# Patient Record
Sex: FEMALE | Race: WHITE
[De-identification: names, ages, dates, MRNs, and addresses within clinical notes are randomized per-mention and may not be internally consistent; named-entity substitution may affect disease eponyms.]

---

## 2017-02-09 NOTE — HP
CC:  Bi Chairez MD; Raisa Kenney NP at [facility].

 

ADMISSION HISTORY AND PHYSICAL:

 

DATE OF ADMISSION:  17

 

ATTENDING SURGEON:  Armando Garsia MD

 

CHIEF COMPLAINT:  Chronic cholecystitis.

 

HISTORY OF PRESENT ILLNESS:  This is a 31-year-old female who since 2016 has had symptoms of up
per abdominal pain.  This typically occurs after eating associated with nausea and vomiting.  It is 
located in the right upper quadrant and epigastric region with radiation to the back.  It is particu
larly worse after fatty food ingestion.  In fact, she has changed her eating habits significantly an
d as a result has lost approximately 25 pounds.  She denies history of dark urine or light colored s
tool.  There is a family history of gallbladder disease in her grandmother.  She did have lab work c
vee in August, which revealed normal liver function test.  A prior ultrasound in  was apparen
tly normal.  More recently, on 16, she underwent HIDA scan, which showed minimal gallbladder f
illing at the end of 2 hours.  There was patency of the common bile duct.  EGD was performed showing
 only mild duodenitis.  A gastric emptying study done on 17 was normal.  The patient was then 
referred for surgical evaluation and was seen in the office by Dr. Garsia on 17.  Dr. Garsia rev
iewed her history and workup and felt that symptoms and HIDA scan were consistent with chronic radha
cystitis and that repeat ultrasound was not indicated.  He has reviewed with her the indications for
 surgery.  The risks, benefits, and alternatives.  She understands the expected perioperative course
 and would like to proceed as scheduled with laparoscopic cholecystectomy.

 

PAST MEDICAL HISTORY:  Scoliosis (status post Clement jose miguel surgery in ), she does have some de
gree of chronic neck and back pain.  She has a history of anxiety, migraine headaches.

 

PAST SURGICAL HISTORY:  Clement jose miguel placement (posterior approach with bone grafting,  i
n ).  She does report history of postoperative nausea and vomiting.

 

CURRENT MEDICATIONS:

1.  Ibuprofen 200 mg three tablets p.r.n. for neck and back pain (does not use daily).

2.  Hydrocodone/APAP 5/325 one tablet q.4 to 6 hours p.r.n. neck and back pain (uses infrequently).

3.  Lorazepam 0.25 mg b.i.d. p.r.n. anxiety (uses infrequently).

4.  Multivitamin once daily.

 

DRUG ALLERGIES:  None known.

 

FAMILY HISTORY:  As above.  No known family history of anesthesia problems, bleeding, or clotting di
sorders.

 

SOCIAL HISTORY:  The patient is .  She has 2 children.  She is employed as a .  Michael silvestre smokes one half pack per day and we discussed smoking cessation. She denies history of alcohol use
 or other recreational drug use.

 

REVIEW OF SYSTEMS:  General:  No recent constitutional symptoms other than as noted above.  Also, michael silvestre is recovering from an episode of flu (tested negative, but has had some lingering congestion; no r
ecent fever or productive cough). Cardiovascular:  No chest pain, palpitations, history of hypertens
ion or heart murmur.  Respiratory:  No history of asthma, chronic cough, or shortness of breath. GI:
  As above per HPI.  She also has occasions of loose stool following attacks of upper abdominal pain
.  :  No problems reported.  GYN:  She underwent routine breast and pelvic exams as well as  Pap s
mear in 2016 all reportedly normal.  Endocrine:  No diabetes or thyroid dysfunction.

 

                               PHYSICAL EXAMINATION

 

GENERAL:  Well-nourished, well-developed mildly obese female, in no acute distress.

 

VITAL SIGNS:  Height 62 inches, weight 170 pounds by history, other vital signs per nursing.

 

HEENT:  Pupils equal, round, and reactive.  EOMs intact.  No conjunctival pallor or scleral icterus.
  Oropharynx:  Mucous membranes moist.  No intraoral lesions.

 

SKIN:  Warm and dry.  No suspicious rashes or lesions noted.

 

NECK:  No lymphadenopathy, thyromegaly, or masses.

 

LUNGS:  Clear to auscultation.  No wheezes.

 

HEART:  Regular rate and rhythm.  No murmur appreciated.

 

BREASTS:  Not examined.

 

ABDOMEN:  Well healed Pfannenstiel incision.  Flat, nondistended.  Soft and without significant tend
erness to palpation on today's exam.  No palpable masses or organomegaly.

 

GENITALIA:  Not done.

 

RECTAL:  Not done.

 

BACK:  Well healed surgical scar.

 

EXTREMITIES:  No edema.

 

NEUROLOGICAL:  Grossly intact.

 

 IMPRESSION:  Chronic cholecystitis.

 

PLAN:  Laparoscopic cholecystectomy.

 

 ____________________________________ STEVEN MOREIRA, PA

 

[id number]/[facility] #: [id number]

## 2017-02-22 ENCOUNTER — HOSPITAL ENCOUNTER (OUTPATIENT)
Dept: HOSPITAL 25 - OR | Age: 32
Discharge: HOME | End: 2017-02-22
Attending: SURGERY
Payer: COMMERCIAL

## 2017-02-22 VITALS — SYSTOLIC BLOOD PRESSURE: 99 MMHG | DIASTOLIC BLOOD PRESSURE: 66 MMHG

## 2017-02-22 DIAGNOSIS — F17.210: ICD-10-CM

## 2017-02-22 DIAGNOSIS — K81.1: Primary | ICD-10-CM

## 2017-02-22 LAB
MANUAL ENTRY VERIFICATION: (no result)
UR PREG INTERNAL CONTROL: (no result)
UR PREG KIT LOT#: (no result)

## 2017-02-22 PROCEDURE — 81025 URINE PREGNANCY TEST: CPT

## 2017-02-22 PROCEDURE — 88304 TISSUE EXAM BY PATHOLOGIST: CPT

## 2017-02-22 NOTE — PN
Progress Note





- Progress Note


Note: 





Brief Operative Note:





Preop Dx: chronic cholecystitis


Postop Dx: same


Procedure: laparoscopic cholecystectomy


Anesthesia: GET


Surgeon: Ady


Asst: KRISSY Pereira; Yamil Cabrera


EBL: none


Fluids: 1500 ml RL


Drains: none


Findings: dictated

## 2017-03-09 NOTE — OP
DATE OF OPERATION:  02/22/17 [facility]

 

DATE OF BIRTH:  [birth year]

 

SURGEON:  Armando Garsia MD.

 

ASSISTANTS:  KRISSY Hanson, and PA Student, Gray.



ANESTHESIOLOGIST:  Speedy Pollack MD

 

ANESTHESIA:  General.

 

PRE-OP DIAGNOSIS:  Chronic cholecystitis.

 

POST-OP DIAGNOSIS:  Chronic cholecystitis.

 

OPERATIVE PROCEDURE:  Laparoscopic cholecystectomy.

 

ESTIMATED BLOOD LOSS:  Minimal.

 

FLUIDS:  1500 cc of Lactated Ringer's.

 

DRAINS:  None.

 

DESCRIPTION OF PROCEDURE:  The patient was identified in the preoperative area, 
marked, brought to the operating room, placed on the operating table in supine 
position.  Preoperative antibiotics were given.  Sequential devices were placed 
in bilateral lower extremities.  General anesthesia was induced.  Patient's 
abdomen was prepped and draped in a standard surgical fashion and a time-out 
was performed.

 

The folds of the umbilicus were elevated anteriorly and a Veress needle was 
inserted into the abdominal cavity, which was then allowed to be insufflated to 
a pressure of 15 mmHg.  The patient tolerated the insufflation well.  An 
incision was made over the Veress needle, which was then removed and a 5-mm 
trocar was inserted into the abdominal cavity.  A laparoscope was inserted 
through this and there was no evidence of injury from the trocar insertion.  
Table was then positioned to view the gallbladder.  Additional trocars were 
placed in the following position; a 12 mm in the subxiphoid area, two 5 mm 
along the right costal margin.  The fundus of the gallbladder was identified.  
It was elevated anteriorly.  A blunt dissection was carried out to free some 
adhesions at this site.  This was then elevated over the liver.  Infundibulum 
was then grasped and retracted towards the right lower quadrant to help expose 
the critical view.  Peritoneal attachments were taken. Cystic artery and cystic 
ducts were isolated.  They were doubly clipped and ligated and the gallbladder 
was removed from the liver bed and placed in an endoscopic retrieval bag and 
brought out through the subxiphoid port.  Cystic duct stump and cystic artery 
stump were reviewed as typical and were within normal limits.  The abdomen was 
allowed to collapse.  Trocars were removed under direct vision and all 4 skin 
incisions were reviewed and approximated with 4-0 Monocryl subcuticular sutures 
followed by sterile dressing.  Patient tolerated the procedure well and was 
transferred to the PACU in stable condition.



CC:  [facility]; Raisa Kenney NP* 

 

 [id number]/[facility] #: [id number]

SHERINE

## 2017-09-28 ENCOUNTER — HOSPITAL ENCOUNTER (EMERGENCY)
Dept: HOSPITAL 25 - UCEAST | Age: 32
Discharge: HOME | End: 2017-09-28
Payer: COMMERCIAL

## 2017-09-28 VITALS — SYSTOLIC BLOOD PRESSURE: 117 MMHG | DIASTOLIC BLOOD PRESSURE: 72 MMHG

## 2017-09-28 DIAGNOSIS — J06.9: Primary | ICD-10-CM

## 2017-09-28 DIAGNOSIS — F17.210: ICD-10-CM

## 2017-09-28 DIAGNOSIS — F41.9: ICD-10-CM

## 2017-09-28 DIAGNOSIS — R19.7: ICD-10-CM

## 2017-09-28 DIAGNOSIS — R11.2: ICD-10-CM

## 2017-09-28 PROCEDURE — 99212 OFFICE O/P EST SF 10 MIN: CPT

## 2017-09-28 PROCEDURE — 81003 URINALYSIS AUTO W/O SCOPE: CPT

## 2017-09-28 PROCEDURE — G0463 HOSPITAL OUTPT CLINIC VISIT: HCPCS

## 2017-09-28 NOTE — UC
Abdominal Pain Female HPI





- HPI Summary


HPI Summary: 





cough ear ache headache st,nausea, vomiting diarrhea waxing and waning for 6 

days--no fevers, voiding qs





- History of Current Complaint


Chief Complaint: UCRespiratory


Stated Complaint: URI


Time Seen by Provider: 09/28/17 19:30


Hx Obtained From: Patient


Hx Last Menstrual Period: IMPLANTED BIRTH CONTROL


Pregnant?: No


Onset/Duration: Gradual Onset, Lasting Days, Other - waxing and waning


Timing: Intermittent Episodes Lasting:


Severity Initially: Mild


Severity Currently: Mild


Location: Diffuse


Radiates: No


Radiates to: Other


Character: Aching, Cramping


Aggravating Factor(s): Food


Alleviating Factor(s): Nothing


Associated Signs and Symptoms: Positive: Decreased Appetite, Nausea, Vomiting, 

Diarrhea


Allergies/Adverse Reactions: 


 Allergies











Allergy/AdvReac Type Severity Reaction Status Date / Time


 


Minocycline Allergy Intermediate Hives Verified 09/28/17 19:27











Home Medications: 


 Home Medications





Accutaine* 80 mg PO DAILY 09/28/17 [History Confirmed 09/28/17]











PMH/Surg Hx/FS Hx/Imm Hx


Previously Healthy: No


Psychological History: Anxiety





- Surgical History


Surgical History: Yes


Surgery Procedure, Year, and Place: SPINE surgery age 14 DANDRE RODS DOWN 

SPINE;.  c section 2012;.  wisdom teeth removed;.  GALLBLADDER;





- Family History


Known Family History: Positive: None





- Social History


Occupation: Employed Part-time


Lives: With Family


Alcohol Use: None


Substance Use Type: None


Smoking Status (MU): Current Every Day Smoker


Type: Cigarettes


Amount Used/How Often: 1/2 ppd


Have You Smoked in the Last Year: Yes


Household Exposure Type: Cigarettes





- Immunization History


Most Recent Influenza Vaccination: NOT THIS SEASON


Most Recent Tetanus Shot: UNSURE





Review of Systems


Constitutional: Negative


Skin: Negative


Eyes: Negative


ENT: Sore Throat, Ear Ache, Nasal Discharge


Respiratory: Negative


Cardiovascular: Negative


Gastrointestinal: Abdominal Pain, Vomiting, Diarrhea, Nausea


Genitourinary: Negative


Motor: Negative


Neurovascular: Negative


Musculoskeletal: Negative


Neurological: Negative


Psychological: Negative


Is Patient Immunocompromised?: No


All Other Systems Reviewed And Are Negative: Yes





Physical Exam


Triage Information Reviewed: Yes


Appearance: Ill-Appearing - mild, Pain Distress - mild


Vital Signs: 


 Initial Vital Signs











Temp  97.6 F   09/28/17 19:23


 


Pulse  80   09/28/17 19:23


 


Resp  16   09/28/17 19:23


 


BP  117/72   09/28/17 19:23


 


Pulse Ox  100   09/28/17 19:23











Vital Signs Reviewed: Yes


Eye Exam: Normal


Eyes: Positive: Conjunctiva Clear


ENT Exam: Normal


ENT: Positive: Normal ENT inspection, Hearing grossly normal, Pharynx normal, 

Nasal congestion, TMs normal.  Negative: Nasal drainage, Tonsillar swelling, 

Tonsillar exudate, Trismus, Muffled/hoarse voice


Dental Exam: Normal


Neck exam: Normal


Neck: Positive: Supple, Nontender, Enlarged Nodes @


Respiratory Exam: Normal


Respiratory: Positive: Chest non-tender, Lungs clear, Normal breath sounds, No 

respiratory distress, No accessory muscle use


Cardiovascular Exam: Normal


Cardiovascular: Positive: RRR, No Murmur, Pulses Normal, Brisk Capillary Refill


Abdominal Exam: Normal


Abdomen Description: Positive: No Organomegaly, Soft, Other: - diffuse 

discomfort.  Negative: CVA Tenderness (R), CVA Tenderness (L), Distended, 

Guarding, Hepatomegaly, McBurney's Point Tenderness, Peritoneal Signs, 

Pulsatile Mass, Splenomegaly


Bowel Sounds: Positive: Present


Musculoskeletal Exam: Normal


Musculoskeletal: Positive: Strength Intact, ROM Intact, No Edema


Neurological Exam: Normal


Neurological: Positive: Alert, Muscle Tone Normal


Psychological Exam: Normal


Skin Exam: Normal





Diagnostics





- Laboratory


Diagnostic Studies Completed/Ordered: ua-trace ketones





Re-Evaluation





- Re-Evaluation


  ** First Eval


Change: Improved - taking po fluids well no pain nausea or vomiting





Abd Pain Female Course/Dx





- Course


Course Of Treatment: clear liquids advance diet slowly, zofran follow with pcp 

prn





- Differential Dx/Diagnosis


Differential Diagnosis: Appendicitis, Constipation, Diverticulitis, Irritable 

Bowel Syndrome, Pelvic Inflammatory Disease, Renal Colic, Urinary Tract 

Infection, Other - acute n/v/d/ viral uri


Provider Diagnoses: Viral uri, acute n/v/d





Discharge





- Discharge Plan


Condition: Stable


Disposition: HOME


Patient Education Materials:  Urinary Tract Infection in Women (ED), Clear 

Liquid Diet (ED), Acute Nausea and Vomiting (ED), Acute Diarrhea (ED), 

Nutrition Tips for Relief of Diarrhea (ED)


Forms:  *Work Release


Referrals: 


Raisa Kenney NP [Primary Care Provider] - 3 Days

## 2017-12-24 ENCOUNTER — HOSPITAL ENCOUNTER (EMERGENCY)
Dept: HOSPITAL 25 - UCEAST | Age: 32
Discharge: HOME | End: 2017-12-24
Payer: COMMERCIAL

## 2017-12-24 VITALS — DIASTOLIC BLOOD PRESSURE: 73 MMHG | SYSTOLIC BLOOD PRESSURE: 112 MMHG

## 2017-12-24 DIAGNOSIS — J20.9: Primary | ICD-10-CM

## 2017-12-24 PROCEDURE — G0463 HOSPITAL OUTPT CLINIC VISIT: HCPCS

## 2017-12-24 PROCEDURE — 93005 ELECTROCARDIOGRAM TRACING: CPT

## 2017-12-24 PROCEDURE — 71020: CPT

## 2017-12-24 PROCEDURE — 99213 OFFICE O/P EST LOW 20 MIN: CPT

## 2017-12-24 NOTE — UC
Cardiac HPI





- HPI Summary


HPI Summary: 


 Pt presents to  stating x 4 days her chest feels tight and feels like can't 

get a full breath. Pt denies feeling SOB. No chest burning. Pt with cough with 

yellow secretions. PT with PND, congestion. No fevers, chills + fatigue. No OTC 

meds  No rash + sick contacts.  Pt quit smoking 1 week ago





Pt's medications reviewed this visit








- History of Current Complaint


Chief Complaint: UCChestPain


Stated Complaint: PRESSURE IN CHEST, DIFF BREATHING


Time Seen by Provider: 17 16:23


Hx Obtained From: Patient


Hx Last Menstrual Period: 17 (implant)


Onset/Duration: Gradual Onset


Timing: Constant


Initial Severity: Mild


Current Severity: Mild





- Allergy/Home Medications


Allergies/Adverse Reactions: 


 Allergies











Allergy/AdvReac Type Severity Reaction Status Date / Time


 


Minocycline Allergy Intermediate Hives Verified 17 16:44











Home Medications: 


 Home Medications





Ibuprofen [Advil] 200 mg PO Q6H PRN 17 [History Confirmed 17]











PMH/Surg Hx/FS Hx/Imm Hx


Previously Healthy: Yes - scoliosis





- Surgical History


Surgical History: Yes


Surgery Procedure, Year, and Place: SPINE surgery age 14 DANDRE RODS DOWN 

SPINE;.  c section ;.  wisdom teeth removed;.  GALLBLADDER;





- Family History


Known Family History: Positive: None





- Social History


Occupation: Employed Full-time


Lives: With Family


Alcohol Use: None


Substance Use Type: None


Smoking Status (MU): Former Smoker


Type: Cigarettes


Amount Used/How Often: 1/2 ppd


Length of Time of Smoking/Using Tobacco: quit 1 wk ago


Have You Smoked in the Last Year: Yes


Household Exposure Type: Cigarettes





- Immunization History


Most Recent Influenza Vaccination: NOT THIS SEASON


Most Recent Tetanus Shot: UNSURE





Review of Systems


Constitutional: Fatigue


Skin: Negative


Respiratory: Cough


Cardiovascular: Chest Pain - "tight with breathing"


All Other Systems Reviewed And Are Negative: Yes





Physical Exam


Triage Information Reviewed: Yes


Appearance: Well-Appearing, No Pain Distress, Well-Nourished


Vital Signs: 


 Initial Vital Signs











Temp  97.8 F   17 16:32


 


Pulse  68   17 16:32


 


Resp  16   17 16:32


 


BP  112/73   17 16:32


 


Pulse Ox  100   17 16:32











Vital Signs Reviewed: Yes


Eye Exam: Normal


Eyes: Positive: Conjunctiva Clear


ENT Exam: Normal


ENT: Positive: Normal ENT inspection, Hearing grossly normal, Pharynx normal


Dental Exam: Normal


Neck exam: Normal


Neck: Positive: Supple, Nontender, No Lymphadenopathy


Respiratory Exam: Normal


Respiratory: Positive: Chest non-tender, Other: - tight bs throughout  few 

scattered wheeze


Cardiovascular Exam: Normal


Cardiovascular: Positive: RRR, No Murmur, Pulses Normal


Abdominal Exam: Normal


Abdomen Description: Positive: Nontender, No Organomegaly, Soft


Bowel Sounds: Positive: Present


Musculoskeletal Exam: Normal


Neurological Exam: Normal


Neurological: Positive: Alert


Psychological Exam: Normal


Skin Exam: Normal





Diagnostics





- Radiology


  ** No standard instances


Radiology Interpretation Completed By: Radiologist - Ordering Physician: Tram Carter MD Acct.#: [id number] : 1985 Age: 32 Sex: F Location: [facility] Exam Date: 17 1648 ADM Status: [facility] ER  Order 

Information: CHEST PA LAT 2 VWS Accession Number: [id number] CPT: 05602 

Indication: Cough.  2 views of the chest including dual energy PA views 

demonstrates no pleural fluid, pneumonia or pneumothorax. Heart is of normal 

size and configuration. Marked dextroscoliosis of the thoracic spine is 

present.  IMPRESSION: No active cardiopulmonary disease is noted.   ____________

________________________________________________ <Electronically signed by Jacinda Peres MD in OV> 17  Dictated By: Jacinda Peres MD Dictated Date/Time

: 17 Transcribed Date/Time: 17 6984  Copy to:





- EKG


Cardiac Rate: NL


Cardiac Rhythm: Sinus: Normal - inverted T wave 3 - no change 


Ectopy: None


ST Segment: Normal





Re-Evaluation





- Re-Evaluation


  ** First Eval


Change: Improved - Pt states feels markedly improved + BS throughout  no w/r 

states feels "better"  Will give Rx zithromax (dispense for tomorrow)  MDI 

secretion precautions Pt comfortable and in agreement with plan





- Assessment/Plan


Course Of Treatment: Pt with chest congestion, tightness and cough x 10 days.  

Pt with tight bs and scattered wheeze.  No concern for cardiac origin - EKG 

reviewed and unchanged from 2012.  Will give duoneb and reassess.  check CXR





- Clinical Impression


Provider Diagnoses: acute bronchitis





Discharge





- Discharge Plan


Condition: Stable


Disposition: HOME


Prescriptions: 


Azithromycin TAB* [Zithromax TAB (Z-OLY) 250 mg #6 tabs] 250 mg PO DAILY #3 tab


Patient Education Materials:  Acute Bronchitis (ED)


Referrals: 


Raisa Kenney NP [Primary Care Provider] - 


Additional Instructions: 


- Stay well hydrated. Drink plenty of non-alcoholic, non-caffinated beverages.


- Take antibiotics as prescribed until gone - you have 3 more tabs to  

at the pharmacy on Tuesday


- After you have been on antibiotics for 2 days - change your toothbrush and 

your pillowcase. These infections are spread by secretions - do NOT share 

eating or drinking utensils - clean items you share with other people such as 

cell phones, computer mouse, TV remote, computer tablets,  etc


-  Use inhaler - 2 puffs every 4 hours today, then every 4 hours as needed


- Okay to use over the counter medication for cough


 - Okay to alternate ibuprofen (Advil, Motrin) and tylenol every 3 hours for 

pain. take with food 


- Contact your doctor to schedule a follow-up appointment.  Contact your doctor 

or return with questions or concerns

## 2017-12-24 NOTE — RAD
Indication: Cough.



2 views of the chest including dual energy PA views demonstrates no pleural fluid,

pneumonia or pneumothorax. Heart is of normal size and configuration. Marked

dextroscoliosis of the thoracic spine is present.



IMPRESSION: No active cardiopulmonary disease is noted.

## 2018-04-17 ENCOUNTER — HOSPITAL ENCOUNTER (EMERGENCY)
Dept: HOSPITAL 25 - ED | Age: 33
Discharge: HOME | End: 2018-04-17
Payer: COMMERCIAL

## 2018-04-17 VITALS — DIASTOLIC BLOOD PRESSURE: 66 MMHG | SYSTOLIC BLOOD PRESSURE: 108 MMHG

## 2018-04-17 DIAGNOSIS — R06.02: ICD-10-CM

## 2018-04-17 DIAGNOSIS — Z87.891: ICD-10-CM

## 2018-04-17 DIAGNOSIS — R07.89: ICD-10-CM

## 2018-04-17 DIAGNOSIS — R05: ICD-10-CM

## 2018-04-17 DIAGNOSIS — L76.34: Primary | ICD-10-CM

## 2018-04-17 LAB
BASOPHILS # BLD AUTO: 0.1 10^3/UL (ref 0–0.2)
EOSINOPHIL # BLD AUTO: 1.2 10^3/UL (ref 0–0.6)
HCT VFR BLD AUTO: 37 % (ref 35–47)
HGB BLD-MCNC: 12.8 G/DL (ref 12–16)
LYMPHOCYTES # BLD AUTO: 2.7 10^3/UL (ref 1–4.8)
MCH RBC QN AUTO: 31 PG (ref 27–31)
MCHC RBC AUTO-ENTMCNC: 34 G/DL (ref 31–36)
MCV RBC AUTO: 90 FL (ref 80–97)
MONOCYTES # BLD AUTO: 0.5 10^3/UL (ref 0–0.8)
NEUTROPHILS # BLD AUTO: 7.7 10^3/UL (ref 1.5–7.7)
NRBC # BLD AUTO: 0 10^3/UL
NRBC BLD QL AUTO: 0.1
PLATELET # BLD AUTO: 272 10^3/UL (ref 150–450)
RBC # BLD AUTO: 4.15 10^6/UL (ref 4–5.4)
WBC # BLD AUTO: 12.2 10^3/UL (ref 3.5–10.8)

## 2018-04-17 PROCEDURE — 71275 CT ANGIOGRAPHY CHEST: CPT

## 2018-04-17 PROCEDURE — 96374 THER/PROPH/DIAG INJ IV PUSH: CPT

## 2018-04-17 PROCEDURE — 84484 ASSAY OF TROPONIN QUANT: CPT

## 2018-04-17 PROCEDURE — 80053 COMPREHEN METABOLIC PANEL: CPT

## 2018-04-17 PROCEDURE — 36415 COLL VENOUS BLD VENIPUNCTURE: CPT

## 2018-04-17 PROCEDURE — 99282 EMERGENCY DEPT VISIT SF MDM: CPT

## 2018-04-17 PROCEDURE — 83605 ASSAY OF LACTIC ACID: CPT

## 2018-04-17 PROCEDURE — 84702 CHORIONIC GONADOTROPIN TEST: CPT

## 2018-04-17 PROCEDURE — 85025 COMPLETE CBC W/AUTO DIFF WBC: CPT

## 2018-04-17 PROCEDURE — 93005 ELECTROCARDIOGRAM TRACING: CPT

## 2018-04-17 NOTE — ED
Shay DIAZ Julia, scribed for Ramses Sosa MD on 04/17/18 at 1133 .





HPI Chest Pain





- HPI Summary


HPI Summary: 





This patient is a 32 year old F presenting to [facility] with a chief complaint of 

intermittent  mid sternal chest heaviness and SOB since yesterday s/p back 

surgery on 4/3/18. Patient reports a mild cough. Patient denies fever, 

abdominal pain, and urinary symptoms. The patient rates the pain 4/10 in 

severity. Symptoms aggravated by lying down. She states the bruising from her 

chest after her back surgery, for spinal stenosis and scoliosis,  has just 

recently healed.  She has been taking oxycodone and Tylenol for pain, and 

regularly takes hypothyroid medication. She is not taking blood thinners. Pt 

has Nexplanon implant. 





- History of Current Complaint


Chief Complaint: EDChestWallPain


Time Seen by Provider: 04/17/18 11:17


Hx Obtained From: Patient


Hx Last Menstrual Period: 12/23/17 (implant)


Onset/Duration: Started Days Ago, Still Present


Timing: Intermittent


Pain Intensity: 4


Pain Scale Used: 0-10 Numeric


Chest Pain Location: Mid Sternal


Chest Pain Radiates: No


Character: Heaviness


Associated Signs and Symptoms: Positive: Shortness of Breath, Cough.  Negative: 

Fever, Abdominal Pain





- Allergy/Home Medications


Allergies/Adverse Reactions: 


 Allergies











Allergy/AdvReac Type Severity Reaction Status Date / Time


 


MS Minocycline [Minocycline] Allergy Intermediate Hives Verified 12/24/17 16:44














PMH/Surg Hx/FS Hx/Imm Hx


Endocrine/Hematology History: Reports: Hx Thyroid Disease - Hypothyroidism


   Denies: Hx Diabetes


Cardiovascular History: 


   Denies: Hx Congestive Heart Failure, Hx Hypertension, Hx Pacemaker/ICD


Respiratory History: 


   Denies: Hx Asthma, Hx Chronic Obstructive Pulmonary Disease (COPD)


GI History: Reports: Other GI Disorders - gall bladder


   Denies: Hx Ulcer


 History: 


   Denies: Hx Renal Disease


Musculoskeletal History: Reports: Hx Arthritis - osteoarthritis in lumbar back, 

Hx Scoliosis - Clement rods


Sensory History: 


   Denies: Hx Contacts or Glasses, Hx Hearing Aid


Opthamlomology History: 


   Denies: Hx Contacts or Glasses


Neurological History: Reports: Hx Headaches - FOR 2 WEEKS, Hx Migraine


Psychiatric History: Reports: Hx Anxiety


   Denies: Hx Panic Disorder





- Surgical History


Surgery Procedure, Year, and Place: SPINE surgery age 14 DANDRE RODS DOWN 

SPINE;.  c section 2012;.  wisdom teeth removed;.  GALLBLADDER;


Hx Anesthesia Reactions: Yes - after wisdom teeth removed, nausea and vomiting 

after anesthesia


Infectious Disease History: No


Infectious Disease History: 


   Denies: Hx Clostridium Difficile, Hx Hepatitis, Hx Human Immunodeficiency 

Virus (HIV), Hx of Known/Suspected MRSA, Hx Shingles, Hx Tuberculosis, Hx Known/

Suspected VRE, Hx Known/Suspected VRSA, History Other Infectious Disease, 

Traveled Outside the [place] in Last 30 Days





- Family History


Known Family History: Positive: None





- Social History


Alcohol Use: None


Substance Use Type: Reports: None


Hx Tobacco Use: Yes - quit 4 months ago


Smoking Status (MU): Former Smoker


Type: Cigarettes


Amount Used/How Often: 1/2 ppd


Length of Time of Smoking/Using Tobacco: quit 1 wk ago


Have You Smoked in the Last Year: Yes





Review of Systems


Negative: Fever


Positive: Chest Pain


Positive: Shortness Of Breath, Cough


Negative: Abdominal Pain


Positive: no symptoms reported


All Other Systems Reviewed And Are Negative: Yes





Physical Exam





- Summary


Physical Exam Summary: 





Appearance: Well appearing, no pain distress


Skin: warm, dry, reflects adequate perfusion, moist mucous membranes


Head/face: normal


Eyes: EOMI, AARON


ENT: normal


Neck: supple, non-tender


Respiratory: CTA, breath sounds present


Cardiovascular: RRR, pulses symmetrical, Sub sternal reproducible chest pain 

upon applying pressure


Abdomen: non-tender, soft


Bowel Sounds: present


Musculoskeletal: strength/ROM intact, Surgical wound down entire thoracic and 

lumbar spine, dressing is dry and in tact, surgical wound is clean dry and 

intact there is no break down of the wound or any bruising present


Neuro: normal, sensory motor intact, A&Ox3





Triage Information Reviewed: Yes


Vital Signs On Initial Exam: 


 Initial Vitals











Temp Pulse Resp BP Pulse Ox


 


 97.8 F   96   16   112/74   99 


 


 04/17/18 11:14  04/17/18 11:14  04/17/18 11:14  04/17/18 11:14  04/17/18 11:14











Vital Signs Reviewed: Yes





Diagnostics





- Vital Signs


 Vital Signs











  Temp Pulse Resp BP Pulse Ox


 


 04/17/18 11:14  97.8 F  96  16  112/74  99














- Laboratory


Lab Results: 


 Lab Results











  04/17/18 04/17/18 04/17/18 Range/Units





  12:55 12:55 12:55 


 


WBC  12.2 H    (3.5-10.8)  10^3/ul


 


RBC  4.15    (4.0-5.4)  10^6/ul


 


Hgb  12.8    (12.0-16.0)  g/dl


 


Hct  37    (35-47)  %


 


MCV  90    (80-97)  fL


 


MCH  31    (27-31)  pg


 


MCHC  34    (31-36)  g/dl


 


RDW  13    (10.5-15)  %


 


Plt Count  272    (150-450)  10^3/ul


 


MPV  8.5    (7.4-10.4)  um3


 


Neut % (Auto)  63.3    (38-83)  %


 


Lymph % (Auto)  22.0 L    (25-47)  %


 


Mono % (Auto)  3.8    (0-7)  %


 


Eos % (Auto)  10.3 H    (0-6)  %


 


Baso % (Auto)  0.6    (0-2)  %


 


Absolute Neuts (auto)  7.7    (1.5-7.7)  10^3/ul


 


Absolute Lymphs (auto)  2.7    (1.0-4.8)  10^3/ul


 


Absolute Monos (auto)  0.5    (0-0.8)  10^3/ul


 


Absolute Eos (auto)  1.2 H    (0-0.6)  10^3/ul


 


Absolute Basos (auto)  0.1    (0-0.2)  10^3/ul


 


Absolute Nucleated RBC  0    10^3/ul


 


Nucleated RBC %  0.1    


 


Sodium   142   (139-145)  mmol/L


 


Potassium   4.1   (3.5-5.0)  mmol/L


 


Chloride   106   (101-111)  mmol/L


 


Carbon Dioxide   29   (22-32)  mmol/L


 


Anion Gap   7   (2-11)  mmol/L


 


BUN   13   (6-24)  mg/dL


 


Creatinine   0.66   (0.51-0.95)  mg/dL


 


Est GFR ( Amer)   135.2   (>60)  


 


Est GFR (Non-Af Amer)   105.2   (>60)  


 


BUN/Creatinine Ratio   19.7   (8-20)  


 


Glucose   94   ()  mg/dL


 


Lactic Acid    1.6  (0.5-2.0)  mmol/L


 


Calcium   9.6   (8.6-10.3)  mg/dL


 


Total Bilirubin   0.30   (0.2-1.0)  mg/dL


 


AST   18   (13-39)  U/L


 


ALT   22   (7-52)  U/L


 


Alkaline Phosphatase   66   ()  U/L


 


Troponin I   0.00   (<0.04)  ng/mL


 


Total Protein   6.7   (6.4-8.9)  g/dL


 


Albumin   4.2   (3.2-5.2)  g/dL


 


Globulin   2.5   (2-4)  g/dL


 


Albumin/Globulin Ratio   1.7   (1-3)  


 


Beta HCG, Quant   2.94   mIU/mL











Result Diagrams: 


 04/17/18 12:55





 04/17/18 12:55


Lab Statement: Any lab studies that have been ordered have been reviewed, and 

results considered in the medical decision making process.





- CT


  ** Chest CT


CT Interpretation Completed By: Radiologist - 1. Motion artifact limits the CT 

pulmonary angiogram at the lung bases. No compelling filling defects are 

identified from the main through the segmental and where visible the 

subsegmental pulmonary arteries to indicate pulmonary embolism. 2. Within the 

posterior paraspinal soft tissues there is a 2.5 cm AP by 4.2 cm transverse by 

19 cm cephalocaudal denser than water fluid collection which surrounds the 

residual T12 and L1 spinous processes is inseparable from the spinal rods 

extending cephalad to the approximate T7 level. Consider postoperative seroma/

hematoma as well as in the appropriate clinical context abscess collection. 

Correlate with clinical assessment.





- EKG


  ** 1127


Cardiac Rate: NL - at 85 BPM


EKG Rhythm: Sinus Rhythm


EKG Interpretation: nml interval, nml axis, poor r wave progression, low voltage





Re-Evaluation





- Re-Evaluation


  ** 1


Re-Evaluation Time: 14:40


Comment: Informed pt of results





Chest Pain Course/Dx





- Course


Course Of Treatment: pt with ant chest discomfort. Assume DDimer will be + 

after recent surg. CT PE performed. No PE but some fluid around surgical site. 

Nothing draining from the wound. No fever. Had surg drain in same area that 

previously drained transudative fluid.





- Chest Pain


Differential Diagnosis/HQI/PQRI: Acute MI, ACS, Lower Respiratory Infection, 

Pulmonary Embolism





- Diagnoses


Provider Diagnoses: 


 History of back surgery, Chest wall pain, Seroma after procedure








Discharge





- Sign-Out/Discharge


Documenting (check all that apply): Discharge





- Discharge Plan


Condition: Good


Disposition: HOME


Patient Education Materials:  Chest Wall Pain (ED), Seroma (DC)


Referrals: 


Lauri Dugan MD [Primary Care Provider] - 


Additional Instructions: 


Call your surgeon at [facility] and have them reevaluate you this week. Take your 

disc and report with you.


Return immediately with drainage from wound, fever/shakes/chills, worse or 

other concerns.


Ibuprofen as needed.





The documentation as recorded by the Shay gomez Julia accurately reflects 

the service I personally performed and the decisions made by me, Ramses Sosa MD.

## 2018-07-09 ENCOUNTER — HOSPITAL ENCOUNTER (EMERGENCY)
Dept: HOSPITAL 25 - UCEAST | Age: 33
Discharge: HOME | End: 2018-07-09
Payer: COMMERCIAL

## 2018-07-09 VITALS — SYSTOLIC BLOOD PRESSURE: 112 MMHG | DIASTOLIC BLOOD PRESSURE: 69 MMHG

## 2018-07-09 DIAGNOSIS — Z79.899: ICD-10-CM

## 2018-07-09 DIAGNOSIS — T81.30XA: Primary | ICD-10-CM

## 2018-07-09 DIAGNOSIS — E03.9: ICD-10-CM

## 2018-07-09 DIAGNOSIS — F41.9: ICD-10-CM

## 2018-07-09 DIAGNOSIS — Z87.891: ICD-10-CM

## 2018-07-09 DIAGNOSIS — Z88.1: ICD-10-CM
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
## 2018-07-09 DIAGNOSIS — F32.9: ICD-10-CM

## 2018-07-09 PROCEDURE — 99212 OFFICE O/P EST SF 10 MIN: CPT

## 2018-07-09 PROCEDURE — G0463 HOSPITAL OUTPT CLINIC VISIT: HCPCS

## 2018-07-09 NOTE — UC
Skin Complaint HPI





- HPI Summary


HPI Summary: 








31 yo female presents with wound to the back of her neck. She tells me that on 4

/3/18 she had 5 rods placed in her upper back for scoliosis by Dr. Garcia in 

[place]. Earlier this evening she scratched the back of her neck and noticed 

some clear/bloody watery discharge. Later in the day her friend told her that 

she has a "hole in her neck" at the site of her scar. She has mild pain in the 

area and denies fever/chills.








- History of Current Complaint


Chief Complaint: UCWounds


Time Seen by Provider: 07/09/18 20:12


Stated Complaint: WOUND ON BACK OF NECK


Hx Obtained From: Patient


Hx Last Menstrual Period: NOW


Onset/Duration: Sudden Onset


Onset Severity: Mild


Current Severity: Mild


Pain Intensity: 3


Pain Scale Used: 0-10 Numeric





- Allergy/Home Medications


Allergies/Adverse Reactions: 


 Allergies











Allergy/AdvReac Type Severity Reaction Status Date / Time


 


minocycline Allergy Severe Hives Verified 07/09/18 20:09











Home Medications: 


 Home Medications





Escitalopram (NF) [Lexapro 10 mg (NF)] 10 mg PO DAILY 07/09/18 [History 

Confirmed 07/09/18]


Levothyroxine TAB* [Synthroid 25 MCG TAB*]  07/09/18 [History]


traZODone TAB* [Desyrel TAB*] 100 mg PO BEDTIME 07/09/18 [History Confirmed 07/ 09/18]











Review of Systems


Constitutional: Negative


Skin: Other - Hole on surgical scar back of neck


Respiratory: Negative


Cardiovascular: Negative


Neurovascular: Negative


Neurological: Negative


Psychological: Negative


All Other Systems Reviewed And Are Negative: Yes





PMH/Surg Hx/FS Hx/Imm Hx





- Additional Past Medical History


Additional PMH: 





Scoliosis


Endocrine History: Hypothyroidism


Psychological History: Anxiety, Depression





- Surgical History


Surgical History: Yes


Surgery Procedure, Year, and Place: SPINE surgery age 14 DANDRE RODS DOWN 

SPINE;.  c section 2012;.  wisdom teeth removed;.  GALLBLADDER;.  BACK SURGERY 

FOR SCOLIOSIS 4/3/18





- Family History


Known Family History: Positive: None





- Social History


Lives: With Family


Alcohol Use: None


Substance Use Type: None


Smoking Status (MU): Former Smoker


Type: Cigarettes


Amount Used/How Often: 1/2 ppd


Length of Time of Smoking/Using Tobacco: quit 1 wk ago


Have You Smoked in the Last Year: Yes


Household Exposure Type: Cigarettes





- Immunization History


Most Recent Influenza Vaccination: NOT THIS SEASON


Most Recent Tetanus Shot: UNSURE





Physical Exam





- Summary


Physical Exam Summary: 





GENERAL: NAD. WDWN. No pain distress.


SKIN: Posterior neck: At the superior end of the surgical scar there is a 2mm 

diameter hole that is 3mm deep. Mildly TTP. No erythema or ecchymosis. No 

streaking, bleeding, or drainage.


NECK: Nontender. No lymphadenopathy. 


CHEST:  No accessory muscle use. Breathing comfortably and in no distress.


CV: Pulses intact


NEURO: Alert. 


PSYCH: Age appropriate behavior.





Triage Information Reviewed: Yes


Vital Signs: 


 Initial Vital Signs











Temp  98.2 F   07/09/18 20:03


 


Pulse  91   07/09/18 20:03


 


Resp  16   07/09/18 20:03


 


BP  112/69   07/09/18 20:03


 


Pulse Ox  100   07/09/18 20:03














Course/Dx





- Course


Course Of Treatment: Wound bandaged with telfa. Advised not to get the area wet 

or have it uncovered. Will cover her with Keflex for infection. F/u with her 

surgeon tomorrow - if unable to do so, follow up with [facility] for 

wound recheck.





- Diagnoses


Provider Diagnoses: wound dehiscence





Discharge





- Sign-Out/Discharge


Documenting (check all that apply): Discharge/Admit/Transfer





- Discharge Plan


Condition: Stable


Disposition: HOME


Prescriptions: 


Cephalexin CAP* [Keflex CAP*] 500 mg PO BID #14 cap


Patient Education Materials:  Surgical Site Infections (ED), Wound Dehiscence (

ED)


Referrals: 


Lauri Dugan MD [Primary Care Provider] - 


[facility] [Outside] - If Needed


Additional Instructions: 


If you develop a fever, shortness of breath, chest pain, new or worsening 

symptoms - please call your PCP or go to the ED.


 


1) Please follow up with your surgeon TOMORROW - if you are unable to do this, 

please call [facility] at the number below and they will see you tomorrow 

for wound recheck





- Billing Disposition and Condition


Condition: STABLE


Disposition: Home

## 2019-02-27 ENCOUNTER — HOSPITAL ENCOUNTER (EMERGENCY)
Dept: HOSPITAL 25 - ED | Age: 34
Discharge: HOME | End: 2019-02-27
Payer: COMMERCIAL

## 2019-02-27 VITALS — DIASTOLIC BLOOD PRESSURE: 95 MMHG | SYSTOLIC BLOOD PRESSURE: 119 MMHG

## 2019-02-27 DIAGNOSIS — M54.9: ICD-10-CM

## 2019-02-27 DIAGNOSIS — M54.2: Primary | ICD-10-CM

## 2019-02-27 DIAGNOSIS — M25.511: ICD-10-CM

## 2019-02-27 DIAGNOSIS — R51: ICD-10-CM
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
## 2019-02-27 DIAGNOSIS — Z87.891: ICD-10-CM

## 2019-02-27 PROCEDURE — 72125 CT NECK SPINE W/O DYE: CPT

## 2019-02-27 PROCEDURE — 72128 CT CHEST SPINE W/O DYE: CPT

## 2019-02-27 PROCEDURE — 72131 CT LUMBAR SPINE W/O DYE: CPT

## 2019-02-27 PROCEDURE — 99282 EMERGENCY DEPT VISIT SF MDM: CPT
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
## 2019-02-27 NOTE — XMS REPORT
Continuity of Care Document (C-CDA R2.1) (Encounter date: 2019 12:00 PM)

 Created on:2019



Patient:MAILE

Sex:Female

:1985

External Reference #:[id number]





Demographics







 Address  [address] [place] [address]

 

 Work Phone  [phone]

 

 Mobile Phone  [phone]

 

 Home Phone  [phone]

 

 Email Address  [email or web address]

 

 Preferred Language  und

 

 Marital Status  Not  or 

 

 Restoration Affiliation  Unknown

 

 Race  Unknown

 

 Additional Race(s)  Other Race

 

 Ethnic Group  Not  or 









Author







 Organization  [facility]

 

 Address  [address] [place], [place] [id number]

 

 Phone  [phone]









Support







 Name  Relationship  Address  Phone

 

 ERAN RIOJAS  Unavailable  Unavailable  [phone]









Care Team Providers







 Name  Role  Phone

 

 Javon NASIMA Ernestina  Unavailable  Unavailable









Allergies, Adverse Reactions, Alerts







 Substance  Reaction  Status

 

 IODINE  Rash  Active







Medications







 Medication  Instructions  Dosage  Effective Dates  Status  Comments



       (start - stop)    

 

 Nexplanon 68 mg  Insert in clinic     -  Active  



 subdermal implant          

 

 Levora-28 0.15  take 1 tablet by  1.00 tablet   -  Active  



 mg-0.03 mg tablet  oral route  every        



   day        

 

 TIROSINT (unknown    Not Available   -  Active  



 strength)          

 

 ESCITALOPRAM    Not Available   -  Active  



 OXALATE (unknown          



 strength)          

 

 LORAZEPAM (unknown  take 0.5  Not Available   -  Active  



 strength)  milliliter by oral        



   route 3 times        



   every day        

 

 Nexplanon 68 mg       -  No Longer  



 subdermal implant        Active  







Problems







 Condition  Effective Dates (start  Clinical Status  Comments



   - stop)    

 

 Enctr for init prescription of      



 implntbl subdermal contracep      

 

 Enctr srvlnc implantable      



 subdermal contraceptive      

 

 Human immunodeficiency virus  Dec- -    



 [HIV] counseling      

 

 Encounter for screening for  Dec- -    



 human immunodeficiency virus      

 

 Encntr screen for dis of the      



 bld/bld-form org/immun mechnsm      

 

 Encntr screen for infections w      



 sexl mode of transmiss      

 

 Enctr srvlnc implantable      



 subdermal contraceptive      

 

 Encntr screen for infections w      



 sexl mode of transmiss      

 

 Encntr for gyn exam (general)      



 (routine) w/o abn findings      

 

 Pruritus vulvae      

 

 STI Screening      

 

 Pelvic Pain - NOS      

 

 Irregular menstrual cycle  Mar- -    

 

 Dyspareunia    Active  

 

 RhD positive    Active  G[place]ell Card

 

 Pain in pelvis    Active  







Procedures







 Procedure  Date

 

 INSERT DRUG IMPLANT DEVICE  

 

 IMPLANON/NEXPLANON  

 

 Method Initiation  

 

 ENDING METHOD IMPLANT  

 

 FACILITY FEE Surgical trays  

 

 Contraceptive  

 

 Method Cessation  

 

 OTHER Medical Services  

 

 Method Initiation  

 

 Contraceptive  

 

 Cns.Svc. Other  

 

 Cns.Svc. STI  







Results







 Test Name  Date and Time  Measure  Units  Reference Range  Abnormal Flag  
Status  Comments









 No information







Advance Directives







 Directive  Yes / No  Effective Date  File Name









 No information







Encounters







 Encounter  Practice  Location  Reason(s)  Diagnoses  Date  Provider  Providers



 Description      For Visit        Copied on



               Encounter

 

   [facility]    Enctr for init    Javon Do.  Referring



   [facility]  [place]    prescription of  [address]  [address]  Provider:



   [facility]      implntbl  9  [facility],  Ernestina



   [place]      subdermal    [place], [address],  [place], [address] [place] [address], [address]      contracepEnct[address]    [place]  [address]      [address],



   , [place],      implantable      [place],



   [place]      subdermal      [place], [address].



   [id number],      contraceptive      



   [place]            



   tel:[phone]            



   [phone]            

 

   [facility]        Sammi  



   [facility]  [place]      [address]  



   [place]        [address],  



   [place]          [place], [place],  



   [place], [address]          [address].  



   [address]          tel:[phone]  



   [facility],          [address]  



   [place],            



   [id number],            



   [place]            



   tel:[phone]            



   [phone]            

 

   [facility]    Human  Dec-1  Guggino  Referring



   [facility]  [place]    immunodeficiency  [address]  [place].  Provider:



   [facility]      virus [HIV]  [address]  Connie



   [place]      counselingEncoun    , [place],  [place] [address],



   [place], [address]      ter for    [place], [address],  [address]



   [address]      screening for    [place].  [address],



   [address], [place],      human    tel:[phone]  [place],



   [place],      immunodeficiency    [address]  [place], [address].



   [id number],      virusEncntr      tel:[phone]



   [place]      screen for dis      [id number]



   tel:[phone]      of the      



   [id number]      bld/bld-form      



         org/immun      



         mechnsmEncntr      



         screen for      



         infections w      



         sexl mode of      



         transmissEnctr      



         srvlnc      



         implantable      



         subdermal      



         contraceptive      

 

   [facility]  [facility]    Encntr screen  Nov-0  Raphaelidis  



   [facility]  [place]    for infections w    [place]. [address]  



   [place]      sexl mode of  [address],  



   [place]      transmissEncntr    [place], [place],  



   [place], [address]      for gyn exam    [address].  



   [address]      (general)    tel:[phone]  



   , [place],      (routine) w/o    [address]  



   [place],      abn      



   [id number],      findingsPruritus      



   [place]      vulvae      



   tel:[phone]            



   [phone]            

 

   [facility]    STI  Mar-3  [place]ilda.  



   [facility]  [place]    ScreeningPelvic    [address]  



   [place]      Pain -  [address], [place],  



   [place]      NOSIrregular    [place], [address].  



   [place], [address]      menstrual cycle    tel:[phone]  



   [address]          [address]  



   [address], [place],            



   [place],            



   [id number],            



   [place]            



   tel:[phone]            



   [phone]            

 

   [facility]  [facility]      Mar-2  Parete  



   [facility]  [place]        Jeannine. [address]  



   [place]        [address],  



   [place]          [place], [place],  



   [place], [address]          [address].  



   [address]          tel:[phone]  



   [address], [place],          [address]  



   [place],            



   [id number],            



   [place]            



   tel:[phone]            



   [phone]            







Family History







 Family Member  Diagnosis  Age At Onset

 

 Father  Cardiovascular disease  

 

 Sister  No history of Myocardial infarction before age 65  

 

 Brother  No history of Stroke before age 55  

 

 Father  No history of Myocardial infarction before age 55  

 

 Mother  No history of Stroke before age 65  

 

 Father  Venous thromboembolism  63

 

 Mother  Cancer, breast  42

 

 Mother  No history of Myocardial infarction before age 65  

 

   Family history of Cancer -unknown  

 

   Family history of Diabetes mellitus  

 

   Family history of Hypertension  

 

 Father  Hypertension  

 

 Mother  Cancer, lung (Cause Of Death)  43

 

   Family history of Heart disease  

 

 Father  No history of Stroke before age 55  

 

 Brother  No history of Myocardial infarction before age 55  

 

 Sister  No history of Stroke before age 65  

 

 Father  Coronary heart disease before age 55  54







Immunizations







 Vaccine  Date  Status  Comments









 No information







Payers







 Payer name  Insurance type  Covered party ID  Authorization(s)

 

 [facility]  [id number]  







Social History







 Type  Description  Quantity  Date Captured  Comments

 

 Alcohol Use Details  Unknown      

 

 Caffeine Use Details  Unknown      

 

 Tobacco Use Status  Current non-smoker      

 

 Smoking Status  Never smoker      

 

 Non-Smoking Tobacco  : No Details Available  : No Details Available  2019  



 Use Details        

 

 Birth Sex  Female      







Vital Signs







 Date /  Height  Weight  BMI  Pulse  Blood  Temperature  Respiratory  Body  
Head  BMI  Pulse  Inhaled



 Time:        Rate  Pressure    Rate  Surface  Circumference  percentile  Ox  Ox



                 Area        

 

   62.00  159.00  29.0    116/2019  in  lbs  8    mm[Hg]              



 12:06      kg/m                  



 PM      eter                  



       (2)                  







Chief Complaint And Reason For Visit

No information



Reason For Referral







 Reason For Referral

 

 No information







Plan Of Treatment







 Date  Type  Action  Status









 No information







History Of Present Illness







 Encounter Date  Complaint  History Of Present Illness









 No information







Functional Status







 Date  Functional Assessment









 No information







Medications Administered







 Medication  Instructions  Dosage  Effective Dates (start - stop)  Status  
Comments









 No information







Instructions







 Date  Instruction  Additional Information









 No information







Assessments







 Type  Assessment  Date

 

 assessment  Enctr for init prescription of implntbl subdermal contracep  

 

 assessment  Enctr srvlnc implantable subdermal contraceptive  







Goals







 Health Concern  Goal  Type  Priority  Status  Date









 No information







Medical Equipment







 Description  Device  Universal Device Identifier  Effective Dates (start - stop
)  Status









 No information







Mental Status







 Date  Cognitive Assessment









 No information







Health Concerns







 Observation  Date









 No information









 Concern  Status  Date









 No information

## 2019-12-31 ENCOUNTER — HOSPITAL ENCOUNTER (EMERGENCY)
Dept: HOSPITAL 25 - UCEAST | Age: 34
Discharge: HOME | End: 2019-12-31
Payer: COMMERCIAL

## 2019-12-31 VITALS — SYSTOLIC BLOOD PRESSURE: 107 MMHG | DIASTOLIC BLOOD PRESSURE: 62 MMHG

## 2019-12-31 DIAGNOSIS — Z79.899: ICD-10-CM

## 2019-12-31 DIAGNOSIS — F41.9: ICD-10-CM

## 2019-12-31 DIAGNOSIS — Z87.891: ICD-10-CM

## 2019-12-31 DIAGNOSIS — Z88.1: ICD-10-CM

## 2019-12-31 DIAGNOSIS — R21: Primary | ICD-10-CM

## 2019-12-31 PROCEDURE — 96372 THER/PROPH/DIAG INJ SC/IM: CPT

## 2019-12-31 PROCEDURE — G0463 HOSPITAL OUTPT CLINIC VISIT: HCPCS

## 2019-12-31 PROCEDURE — 99212 OFFICE O/P EST SF 10 MIN: CPT

## 2019-12-31 NOTE — UC
Skin Complaint HPI





- HPI Summary


HPI Summary: 





33 yo female presents with rash. She tells me that around 12/23 she developed a 

red itchy rash to her neck and torso. She went to her PCP and was told it was 

likely viral and would resolve with time. Was tested for strep and was 

negative. She saw her PCP again on 12/24 as rash appeared to be spreading - was 

placed on 50mg prednisone for 5 days and pt states it appeared to start to 

dissipate, but since stopping has started to come back. Denies fever, chills, 

sore throat, recent illness, change in clothes/perfumes/soaps, wheezing, or 

sob. 





- History of Current Complaint


Time Seen by Provider: 12/31/19 13:02


Stated Complaint: RASH


Hx Obtained From: Patient


Hx Last Menstrual Period: NOW


Onset/Duration: Gradual Onset


Onset Severity: Mild


Current Severity: Mild


Pain Intensity: 3


Pain Scale Used: 0-10 Numeric





- Allergy/Home Medications


Allergies/Adverse Reactions: 


 Allergies











Allergy/AdvReac Type Severity Reaction Status Date / Time


 


minocycline Allergy Severe Hives Verified 12/31/19 13:15











Home Medications: 


 Home Medications





DULoxetine DR CAP* [Cymbalta CAP*] 1 cap PO DAILY 12/31/19 [History Confirmed 12 /31/19]


Gabapentin 1 tab PO DAILY 12/31/19 [History Confirmed 12/31/19]











PMH/Surg Hx/FS Hx/Imm Hx


Endocrine History: Hypothyroidism


Psychological History: Anxiety





- Surgical History


Surgical History: Yes


Surgery Procedure, Year, and Place: SPINE surgery age 14 DANDRE RODS DOWN 

SPINE;.  c section 2012;.  wisdom teeth removed;.  GALLBLADDER;.  BACK SURGERY 

FOR SCOLIOSIS 4/3/18





- Family History


Known Family History: Positive: Cardiac Disease - Father with MI, Hypertension


   Negative: Diabetes





- Social History


Lives: With Family


Alcohol Use: None


Substance Use Type: None


Smoking Status (MU): Former Smoker


Type: Cigarettes


Amount Used/How Often: 1/2 ppd


Length of Time of Smoking/Using Tobacco: quit 2017


Have You Smoked in the Last Year: Yes


Household Exposure Type: Cigarettes





- Immunization History


Most Recent Influenza Vaccination: NOT THIS SEASON


Most Recent Tetanus Shot: UNSURE





Review of Systems


All Other Systems Reviewed And Are Negative: No


Constitutional: Positive: Negative


Skin: Positive: Rash


Respiratory: Positive: Negative


Cardiovascular: Positive: Negative


Neurological: Positive: Negative


Psychological: Positive: Negative





Physical Exam





- Summary


Physical Exam Summary: 





GENERAL: NAD. WDWN. No pain distress.


SKIN: Mildly erythematous scattered urticaria and dermatitis to lower neck and 

about abdomen and back. No abscess, streaking, or open wounds.


NECK: Supple. Nontender. No lymphadenopathy. 


CHEST:  No accessory muscle use. Breathing comfortably and in no distress.


CV:  Pulses intact. Cap refill <2seconds


NEURO: Alert.


PSYCH: Age appropriate behavior.





Triage Information Reviewed: Yes


Vital Signs: 





Vital Signs:











Temp Pulse Resp BP Pulse Ox


 


 98 F   87   16   107/62   100 


 


 12/31/19 13:12  12/31/19 13:12  12/31/19 13:12  12/31/19 13:12  12/31/19 13:12











Vital Signs Reviewed: Yes





Course/Dx





- Course


Course Of Treatment: 





Rash appears allergic or dermatitis. 


I suspect she will need a longer steroid taper than 5 days.


In the clinic she was given an IM infection of 40mg kenalog.


Will rx for 10 days of steroid taper and have her f/u with dermatology if no 

improvement.





- Diagnoses


Provider Diagnosis: 


 Rash








Discharge ED





- Sign-Out/Discharge


Documenting (check all that apply): Patient Departure


All imaging exams completed and their final reports reviewed: No Studies





- Discharge Plan


Condition: Stable


Disposition: HOME


Prescriptions: 


predniSONE [Prednisone 20 MG TAB] 20 mg PO DAILY #20 tablet


Patient Education Materials:  Dermatitis (ED)


Referrals: 


Raisa Kenney, NP [Primary Care Provider] - 


Maryjo Felipe [Medical Doctor] - As Soon As Possible


Additional Instructions: 


If you develop a fever, shortness of breath, chest pain, new or worsening 

symptoms - please call your PCP or go to the ED immediately.


 


Please start the prednisone as directed starting TOMORROW





If your symptoms do not improve - please call Dermatology at the number below 

to schedule an appointment for further evaluation





- Billing Disposition and Condition


Condition: STABLE


Disposition: Home

## 2023-09-18 NOTE — RAD
INDICATION: Chest pain following surgery for scoliosis 2 weeks ago.



COMPARISON: December 24, 2017 radiographs.



TECHNIQUE: Multidetector CT images were obtained from the lung apices to the upper abdomen

with 70 mL Omnipaque 350 IV contrast.  Pulmonary angiogram protocol. Multiplanar

reformation including with maximum intensity projection.



REPORT: Artifact from motion artifact and spinal fixation hardware. Mild bibasilar linear

atelectasis. No alveolar consolidation concerning for pneumonia. Negative for pleural

effusion or pneumothorax.



Upper normal size LEFT axillary level lymph node. Negative for thoracic lymphadenopathy.

Leftward displacement of the heart secondary to dextroscoliosis at the mid to distal

thoracic spine. Mild cardiomegaly. Negative for pericardial effusion. Unremarkable

thoracic aorta.



Motion artifact limits the CT pulmonary angiogram at the lung bases. No compelling filling

defects are identified from the main through the segmental and where visible the

subsegmental pulmonary arteries to indicate pulmonary embolism.



Unremarkable images through the upper abdomen.



Spinal fixation rods extend from the cephalad margin of the field-of-view at the lower

cervical spine through the L1 level.



Within the posterior paraspinal soft tissues there is a 2.5 cm AP by 4.2 cm transverse by

19 cm cephalocaudal denser than water fluid collection which surrounds the residual T12

and L1 spinous processes is inseparable from the spinal rods extending cephalad to the

approximate T7 level. Thin rind of calcification at the margins of the loculated fluid

collection. No abnormal soft tissue gas collection evident.



IMPRESSION: 

1. Motion artifact limits the CT pulmonary angiogram at the lung bases. No compelling

filling defects are identified from the main through the segmental and where visible the

subsegmental pulmonary arteries to indicate pulmonary embolism.

2. Within the posterior paraspinal soft tissues there is a 2.5 cm AP by 4.2 cm transverse

by 19 cm cephalocaudal denser than water fluid collection which surrounds the residual T12

and L1 spinous processes is inseparable from the spinal rods extending cephalad to the

approximate T7 level. Consider postoperative seroma/hematoma as well as in the appropriate

clinical context abscess collection. Correlate with clinical assessment. Chonodrocutaneous Helical Advancement Flap Text: The defect edges were debeveled with a #15 scalpel blade.  Given the location of the defect and the proximity to free margins a chondrocutaneous helical advancement flap was deemed most appropriate.  Using a sterile surgical marker, the appropriate advancement flap was drawn incorporating the defect and placing the expected incisions within the relaxed skin tension lines where possible.    The area thus outlined was incised deep to adipose tissue with a #15 scalpel blade.  The skin margins were undermined to an appropriate distance in all directions utilizing iris scissors.